# Patient Record
Sex: MALE | Race: WHITE | NOT HISPANIC OR LATINO | URBAN - METROPOLITAN AREA
[De-identification: names, ages, dates, MRNs, and addresses within clinical notes are randomized per-mention and may not be internally consistent; named-entity substitution may affect disease eponyms.]

---

## 2022-10-21 ENCOUNTER — OFFICE VISIT (OUTPATIENT)
Dept: URBAN - METROPOLITAN AREA CLINIC 68 | Facility: CLINIC | Age: 57
End: 2022-10-21

## 2022-10-21 RX ORDER — SOD SULF/POT CHLORIDE/MAG SULF 1.479 G
AS DIRECTED TABLET ORAL ONCE
Qty: 1 KIT | OUTPATIENT

## 2022-10-21 NOTE — HPI-MIGRATED HPI
Transition to Care : 57 y.o. healthy WM who is here for a screening colonoscopy. He has never had a colonoscopy. He denies any abdominal pain, no n/v, no gib. He denies any abdominal surgeries. Family history is unknown.

## 2022-10-21 NOTE — EXAM-MIGRATED EXAMINATIONS
General Examination: General appearance: -> alert, pleasant, well-nourished and in no acute distress   Head: -> normocephalic, atraumatic   Eyes: -> normal   Neck / thyroid: -> neck is supple, with full range of motion and no cervical lymphadenopathy   Skin: -> skin is warm and dry, with no rashes, good skin turgor and normal hair distribution   Heart: -> regular rate and rhythm without murmurs, gallops, clicks or rubs   Lungs: -> clear to auscultation bilaterally, with good air movement and no rales, rhonchi or wheezes   Chest: -> chest wall with no costochondral junction tenderness, no rib deformity and normal shape and expansion   Abdomen: -> soft with good bowel sounds, nontender, and no masses or hepatosplenomegaly , negative Hadley's sign   Extremities: -> normal extremity with no clubbing, cyanosis or edema   Neurologic: -> alert and oriented

## 2022-12-12 ENCOUNTER — DASHBOARD ENCOUNTERS (OUTPATIENT)
Age: 57
End: 2022-12-12

## 2022-12-12 ENCOUNTER — OFFICE VISIT (OUTPATIENT)
Dept: URBAN - METROPOLITAN AREA SURGERY CENTER 12 | Facility: SURGERY CENTER | Age: 57
End: 2022-12-12

## 2022-12-12 RX ORDER — SOD SULF/POT CHLORIDE/MAG SULF 1.479 G
AS DIRECTED TABLET ORAL ONCE
Qty: 1 KIT | Status: ACTIVE | COMMUNITY